# Patient Record
Sex: FEMALE | Race: WHITE | Employment: UNEMPLOYED | URBAN - METROPOLITAN AREA
[De-identification: names, ages, dates, MRNs, and addresses within clinical notes are randomized per-mention and may not be internally consistent; named-entity substitution may affect disease eponyms.]

---

## 2019-09-02 ENCOUNTER — ANESTHESIA EVENT (OUTPATIENT)
Dept: SURGERY | Age: 64
End: 2019-09-02
Payer: COMMERCIAL

## 2019-09-03 ENCOUNTER — HOSPITAL ENCOUNTER (OUTPATIENT)
Age: 64
Setting detail: OUTPATIENT SURGERY
Discharge: HOME | End: 2019-09-03
Attending: RADIOLOGY | Admitting: RADIOLOGY
Payer: COMMERCIAL

## 2019-09-03 ENCOUNTER — ANESTHESIA (OUTPATIENT)
Dept: SURGERY | Age: 64
End: 2019-09-03
Payer: COMMERCIAL

## 2019-09-03 ENCOUNTER — APPOINTMENT (OUTPATIENT)
Dept: MRI IMAGING | Age: 64
End: 2019-09-03
Attending: PSYCHIATRY & NEUROLOGY
Payer: COMMERCIAL

## 2019-09-03 VITALS
HEIGHT: 64 IN | WEIGHT: 293 LBS | RESPIRATION RATE: 19 BRPM | HEART RATE: 75 BPM | DIASTOLIC BLOOD PRESSURE: 65 MMHG | BODY MASS INDEX: 50.02 KG/M2 | OXYGEN SATURATION: 99 % | SYSTOLIC BLOOD PRESSURE: 138 MMHG | TEMPERATURE: 98.2 F

## 2019-09-03 DIAGNOSIS — R51.9 HEADACHE: ICD-10-CM

## 2019-09-03 PROCEDURE — 74011250636 HC RX REV CODE- 250/636: Performed by: ANESTHESIOLOGY

## 2019-09-03 PROCEDURE — A9575 INJ GADOTERATE MEGLUMI 0.1ML: HCPCS | Performed by: RADIOLOGY

## 2019-09-03 PROCEDURE — 77030037088 HC TUBE ENDOTRACH ORAL NSL COVD-A: Performed by: ANESTHESIOLOGY

## 2019-09-03 PROCEDURE — 74011000250 HC RX REV CODE- 250

## 2019-09-03 PROCEDURE — 76210000020 HC REC RM PH II FIRST 0.5 HR

## 2019-09-03 PROCEDURE — 70553 MRI BRAIN STEM W/O & W/DYE: CPT

## 2019-09-03 PROCEDURE — 74011250636 HC RX REV CODE- 250/636

## 2019-09-03 PROCEDURE — 77030021678 HC GLIDESCP STAT DISP VERT -B: Performed by: ANESTHESIOLOGY

## 2019-09-03 PROCEDURE — 76060000033 HC ANESTHESIA 1 TO 1.5 HR

## 2019-09-03 PROCEDURE — 76210000006 HC OR PH I REC 0.5 TO 1 HR

## 2019-09-03 PROCEDURE — 74011250636 HC RX REV CODE- 250/636: Performed by: RADIOLOGY

## 2019-09-03 RX ORDER — SODIUM CHLORIDE, SODIUM LACTATE, POTASSIUM CHLORIDE, CALCIUM CHLORIDE 600; 310; 30; 20 MG/100ML; MG/100ML; MG/100ML; MG/100ML
100 INJECTION, SOLUTION INTRAVENOUS CONTINUOUS
Status: DISCONTINUED | OUTPATIENT
Start: 2019-09-03 | End: 2019-09-05 | Stop reason: HOSPADM

## 2019-09-03 RX ORDER — ROCURONIUM BROMIDE 10 MG/ML
INJECTION, SOLUTION INTRAVENOUS AS NEEDED
Status: DISCONTINUED | OUTPATIENT
Start: 2019-09-03 | End: 2019-09-03 | Stop reason: HOSPADM

## 2019-09-03 RX ORDER — LIDOCAINE HYDROCHLORIDE 20 MG/ML
INJECTION, SOLUTION EPIDURAL; INFILTRATION; INTRACAUDAL; PERINEURAL AS NEEDED
Status: DISCONTINUED | OUTPATIENT
Start: 2019-09-03 | End: 2019-09-03 | Stop reason: HOSPADM

## 2019-09-03 RX ORDER — SUCCINYLCHOLINE CHLORIDE 20 MG/ML
INJECTION INTRAMUSCULAR; INTRAVENOUS AS NEEDED
Status: DISCONTINUED | OUTPATIENT
Start: 2019-09-03 | End: 2019-09-03 | Stop reason: HOSPADM

## 2019-09-03 RX ORDER — SODIUM CHLORIDE 0.9 % (FLUSH) 0.9 %
10 SYRINGE (ML) INJECTION
Status: COMPLETED | OUTPATIENT
Start: 2019-09-03 | End: 2019-09-03

## 2019-09-03 RX ORDER — ONDANSETRON 8 MG/1
4 TABLET, ORALLY DISINTEGRATING ORAL ONCE
Status: COMPLETED | OUTPATIENT
Start: 2019-09-03 | End: 2019-09-03

## 2019-09-03 RX ORDER — DEXAMETHASONE SODIUM PHOSPHATE 4 MG/ML
4 INJECTION, SOLUTION INTRA-ARTICULAR; INTRALESIONAL; INTRAMUSCULAR; INTRAVENOUS; SOFT TISSUE ONCE
Status: DISCONTINUED | OUTPATIENT
Start: 2019-09-03 | End: 2019-09-03 | Stop reason: HOSPADM

## 2019-09-03 RX ORDER — ONDANSETRON 2 MG/ML
4 INJECTION INTRAMUSCULAR; INTRAVENOUS ONCE
Status: COMPLETED | OUTPATIENT
Start: 2019-09-03 | End: 2019-09-03

## 2019-09-03 RX ORDER — GADOTERATE MEGLUMINE 376.9 MG/ML
30 INJECTION INTRAVENOUS
Status: COMPLETED | OUTPATIENT
Start: 2019-09-03 | End: 2019-09-03

## 2019-09-03 RX ORDER — PROPOFOL 10 MG/ML
INJECTION, EMULSION INTRAVENOUS AS NEEDED
Status: DISCONTINUED | OUTPATIENT
Start: 2019-09-03 | End: 2019-09-03 | Stop reason: HOSPADM

## 2019-09-03 RX ADMIN — PROPOFOL 200 MG: 10 INJECTION, EMULSION INTRAVENOUS at 08:56

## 2019-09-03 RX ADMIN — ONDANSETRON 4 MG: 2 INJECTION INTRAMUSCULAR; INTRAVENOUS at 10:08

## 2019-09-03 RX ADMIN — SUCCINYLCHOLINE CHLORIDE 200 MG: 20 INJECTION INTRAMUSCULAR; INTRAVENOUS at 08:56

## 2019-09-03 RX ADMIN — GADOTERATE MEGLUMINE 30 ML: 376.9 INJECTION INTRAVENOUS at 09:22

## 2019-09-03 RX ADMIN — ROCURONIUM BROMIDE 5 MG: 10 INJECTION, SOLUTION INTRAVENOUS at 08:56

## 2019-09-03 RX ADMIN — Medication 10 ML: at 09:22

## 2019-09-03 RX ADMIN — LIDOCAINE HYDROCHLORIDE 100 MG: 20 INJECTION, SOLUTION EPIDURAL; INFILTRATION; INTRACAUDAL; PERINEURAL at 08:56

## 2019-09-03 RX ADMIN — ROCURONIUM BROMIDE 25 MG: 10 INJECTION, SOLUTION INTRAVENOUS at 09:04

## 2019-09-03 RX ADMIN — SODIUM CHLORIDE, SODIUM LACTATE, POTASSIUM CHLORIDE, AND CALCIUM CHLORIDE 100 ML/HR: 600; 310; 30; 20 INJECTION, SOLUTION INTRAVENOUS at 07:21

## 2019-09-03 NOTE — ANESTHESIA POSTPROCEDURE EVALUATION
Procedure(s):  MRI ANESTHESIA/ BRAIN WITH AND WITHOUT.    general    Anesthesia Post Evaluation      Multimodal analgesia: multimodal analgesia used between 6 hours prior to anesthesia start to PACU discharge  Patient location during evaluation: bedside  Patient participation: complete - patient participated  Level of consciousness: awake and responsive to light touch  Pain management: adequate  Airway patency: patent  Anesthetic complications: no  Cardiovascular status: acceptable, hemodynamically stable, blood pressure returned to baseline and stable  Respiratory status: acceptable, unassisted, spontaneous ventilation and nonlabored ventilation  Hydration status: acceptable  Post anesthesia nausea and vomiting:  controlled      Vitals Value Taken Time   /65 9/3/2019 10:15 AM   Temp 36.8 °C (98.2 °F) 9/3/2019  9:47 AM   Pulse 70 9/3/2019 10:17 AM   Resp 20 9/3/2019 10:05 AM   SpO2 98 % 9/3/2019 10:17 AM   Vitals shown include unvalidated device data.

## 2019-09-03 NOTE — ANESTHESIA PREPROCEDURE EVALUATION
Relevant Problems   No relevant active problems       Anesthetic History   No history of anesthetic complications            Review of Systems / Medical History  Patient summary reviewed and pertinent labs reviewed    Pulmonary  Within defined limits                 Neuro/Psych         Headaches and psychiatric history     Cardiovascular                  Exercise tolerance: <4 METS     GI/Hepatic/Renal  Within defined limits              Endo/Other  Within defined limits           Other Findings              Physical Exam    Airway  Mallampati: II  TM Distance: 4 - 6 cm  Neck ROM: normal range of motion   Mouth opening: Normal     Cardiovascular    Rhythm: regular  Rate: normal  Peripheral edema    Pertinent negatives: No murmur  Comments: distant Dental  No notable dental hx       Pulmonary  Breath sounds clear to auscultation              Comments: distant Abdominal         Other Findings            Anesthetic Plan    ASA: 3  Anesthesia type: general          Induction: Intravenous  Anesthetic plan and risks discussed with: Patient      DILIP

## 2019-09-03 NOTE — H&P
Patient: Arnel Fajardo MRN: [id number]  SSN: [id number]    YOB: 1955  Age: 60 y.o. Sex: female      History and Physical    Arnel Fajardo is a 60 y.o. female having Procedure(s):  MRI ANESTHESIA/ BRAIN WITH AND WITHOUT. Allergies: Allergies   Allergen Reactions    Pcn [Penicillins] Anaphylaxis    Sulfa (Sulfonamide Antibiotics) Anaphylaxis    Sulfur Dioxide Anaphylaxis     Patient states reaction with any sulfa drug or sulfur containing substance injested    Azithromycin Other (comments)     Per patient affects equilibrium    Statins-Hmg-Coa Reductase Inhibitors Myalgia        Chief Complaint: headache    History of Present Illness:long history of headahce and vestibular inflammation. Neurology /ENT have desire more imaging. Past Medical History:   Diagnosis Date    Anxiety     BPV (benign positional vertigo)     right     Creatinine elevation     states not diagnosed with CKD. Last creatinine 1.24 5/13/2019     Edema, lower extremity     bilateral LE edema    Lymphedema     BL LE    Pre-diabetes     per patient no meds    Venous stasis dermatitis     BLE    Vestibular neuronitis       Past Surgical History:   Procedure Laterality Date    HX CATARACT REMOVAL Bilateral     IOL    HX HYSTERECTOMY      HX ORTHOPAEDIC Right     \"screw placed\" \"tendon lengthening\"    HX TONSILLECTOMY      \"As a child\"      Family History   Problem Relation Age of Onset    Cancer Mother     Heart Disease Father     Hypertension Father     Lung Disease Father     Hypertension Sister     Stroke Sister       Social History     Tobacco Use    Smoking status: Never Smoker    Smokeless tobacco: Never Used   Substance Use Topics    Alcohol use: Never     Frequency: Never        Prior to Admission medications    Medication Sig Start Date End Date Taking? Authorizing Provider   nystatin (MYCOSTATIN) topical cream Apply  to affected area two (2) times daily as needed for Skin Irritation. Yes Provider, Historical   oxymetazoline HCl (AFRIN NASAL SPRAY NA) by Nasal route as needed. Yes Provider, Historical   sodium chloride (AYR SALINE) 0.65 % nasal squeeze bottle 1 Spray as needed for Congestion. Yes Provider, Historical   cholecalciferol, vitamin D3, (VITAMIN D3) 2,000 unit tab Take  by mouth. Hold for procedure. Yes Provider, Historical        Visit Vitals  /71 (BP 1 Location: Right arm, BP Patient Position: At rest;Supine)   Pulse 71   Temp 36.7 °C (98 °F)   Resp 14   Ht 5' 4\" (1.626 m)   Wt (!) 165.1 kg (364 lb)   SpO2 96%   BMI 62.48 kg/m²        Assessment and Plan:   Padmini Contreras is a 60 y.o. female having Procedure(s):  MRI ANESTHESIA/ BRAIN WITH AND WITHOUT for headache. Given patient anxiety, co morbidities and size, GETA will be employed.     Preanesthesia Evaluation     Last edited 09/03/19 0746 by Sue Tran MD  Date of Service 09/03/19 0745             Relevant Problems   No relevant active problems       Anesthetic History   No history of anesthetic complications            Review of Systems / Medical History  Patient summary reviewed and pertinent labs reviewed    Pulmonary  Within defined limits                 Neuro/Psych         Headaches and psychiatric history     Cardiovascular                  Exercise tolerance: <4 METS     GI/Hepatic/Renal  Within defined limits              Endo/Other  Within defined limits           Other Findings              Physical Exam    Airway  Mallampati: II  TM Distance: 4 - 6 cm  Neck ROM: normal range of motion   Mouth opening: Normal     Cardiovascular    Rhythm: regular  Rate: normal  Peripheral edema    Pertinent negatives: No murmur  Comments: distant Dental  No notable dental hx       Pulmonary  Breath sounds clear to auscultation              Comments: distant Abdominal         Other Findings            Anesthetic Plan    ASA: 3  Anesthesia type: general          Induction: Intravenous  Anesthetic plan and risks discussed with: Patient      GETA          Preanesthesia evaluation performed by Kiko Fam MD            Signed By: Tori William MD     September 3, 2019

## 2019-09-03 NOTE — DISCHARGE INSTRUCTIONS
Follow up with Provider that ordered the MRI. Patient Education        MRI of the Head: About This Test  What is it? MRI (magnetic resonance imaging) is a test that uses a magnetic field and pulses of radio wave energy to make pictures of the organs and structures inside the body. An MRI of the head can give your doctor information about your brain, eyes, ears, and nerves. When you have an MRI, you lie on a table and the table moves into the MRI machine. Why is this test done? An MRI of the head can help find problems such as tumors and infection. It also can check symptoms of a head injury or of diseases such as multiple sclerosis (MS) and stroke. How can you prepare for the test?  Talk to your doctor about all your health conditions before the test. For example, tell your doctor if:  · You are allergic to any medicines. · You are or might be pregnant. · You have a pacemaker, an artificial limb, any metal pins or metal parts in your body, metal heart valves, metal clips in your brain, metal implants in your ears, or any other implanted or prosthetic medical device. · You have an intrauterine device (IUD) in place. · You get nervous in confined spaces. You may need medicine to help you relax. · You wear a patch that contains medicine. · You have kidney disease. What happens before the test?  · You will remove all metal objects, such as hearing aids, dentures, jewelry, watches, and hairpins. · You may need to take off some of your clothes. You will be given a gown to wear during the test. If you do leave some clothes on, make sure you take everything out of your pockets. · You may have contrast material (dye) put into your arm through a tube called an IV. Contrast material helps doctors see specific organs, blood vessels, and most tumors. What happens during the test?  · You will lie on your back on a table that is part of the MRI scanner.  Your head, chest, and arms may be held with straps to help you lie still. · The table will slide into the space that contains the magnet. A device called a coil may be placed over or wrapped around your head. · Inside the scanner you will hear a fan and feel air moving. You may hear tapping, thumping, or snapping noises. You may be given earplugs or headphones to reduce the noise. · You will be asked to hold still during the scan. You may be asked to hold your breath for short periods. · You may be alone in the scanning room, but a technologist will be watching you through a window and talking with you during the test.  What else should you know about the test?  · An MRI does not hurt. · You may feel warmth in the area being examined. This is normal.  · A dye (contrast material) that contains gadolinium may be used in this test. Be sure to tell your doctor if:  ? You are pregnant or think you may be pregnant. ? You have kidney problems. ? You've had more than one test that used gadolinium. · The U.S. Food and Drug Administration (FDA) has safety warnings about gadolinium. But for most people, the benefit of its use in this test outweighs the risk. · If a dye is used, you may feel a quick sting or pinch and some coolness when the IV is started. The dye may give you a metallic taste in your mouth. Some people feel sick to their stomach or get a headache. · If you breastfeed and are concerned about whether the dye used in this test is safe, talk to your doctor. Most experts believe that very little dye passes into breast milk and even less is passed on to the baby. But if you prefer, you can store some of your breast milk ahead of time and use it for a day or two after the test.  How long does the test take? · The test usually takes 30 to 60 minutes but can take as long as 2 hours. What happens after the test?  · You will probably be able to go home right away, depending on the reason for the test.  · You can go back to your usual activities right away.   Follow-up care is a key part of your treatment and safety. Be sure to make and go to all appointments, and call your doctor if you are having problems. It's also a good idea to keep a list of the medicines you take. Ask your doctor when you can expect to have your test results. Where can you learn more? Go to http://merary-deidra.info/. Enter T128 in the search box to learn more about \"MRI of the Head: About This Test.\"  Current as of: March 28, 2019  Content Version: 12.1  © 4714-6085 Healthwise, Incorporated. Care instructions adapted under license by [employer] (which disclaims liability or warranty for this information). If you have questions about a medical condition or this instruction, always ask your healthcare professional. [address] any warranty or liability for your use of this information. After general anesthesia or intravenous sedation, for 24 hours or while taking prescription Narcotics:  · Limit your activities  · A responsible adult needs to be with you for the next 24 hours  · Do not drive and operate hazardous machinery  · Do not make important personal or business decisions  · Do  not drink alcoholic beverages  · If you have not urinated within 8 hours after discharge, please contact your surgeon on call. · If you have sleep apnea and have a CPAP machine, please use it for all naps and sleeping. *  Please give a list of your current medications to your Primary Care Provider. *  Please update this list whenever your medications are discontinued, doses are      changed, or new medications (including over-the-counter products) are added. *  Please carry medication information at all times in case of emergency situations.     These are general instructions for a healthy lifestyle:  No smoking/ No tobacco products/ Avoid exposure to second hand smoke  Surgeon General's Warning:  Quitting smoking now greatly reduces serious risk to your health. Obesity, smoking, and sedentary lifestyle greatly increases your risk for illness  A healthy diet, regular physical exercise & weight monitoring are important for maintaining a healthy lifestyle    You may be retaining fluid if you have a history of heart failure or if you experience any of the following symptoms:  Weight gain of 3 pounds or more overnight or 5 pounds in a week, increased swelling in our hands or feet or shortness of breath while lying flat in bed. Please call your doctor as soon as you notice any of these symptoms; do not wait until your next office visit.

## 2020-05-27 ENCOUNTER — DOCUMENTATION ONLY (OUTPATIENT)
Dept: NUTRITION | Age: 65
End: 2020-05-27

## 2020-05-27 NOTE — PROGRESS NOTES
[facility] Nutrition Counseling: Pt referred with a diagnosis of prediabetes included in referral concerns. Pt most appropriately seen at [facility] Diabetes Self Management Program Phone [phone]. Will transfer referral and clinical notes to that department and notify referring provider.      [facility], MS, RD, CSSD, LD  W: [phone]

## 2021-04-14 ENCOUNTER — TELEPHONE (OUTPATIENT)
Dept: NUTRITION | Age: 66
End: 2021-04-14

## 2021-04-14 NOTE — TELEPHONE ENCOUNTER
Nutrition Counseling: Contacted pt regarding referral. See notes documented in Nutrition Counseling Referral for details. No further follow-up contact from pt. Will close referral for this office and notify referring provider.     [email or web address]  [phone]

## 2024-03-19 SDOH — HEALTH STABILITY: PHYSICAL HEALTH: ON AVERAGE, HOW MANY MINUTES DO YOU ENGAGE IN EXERCISE AT THIS LEVEL?: 0 MIN

## 2024-03-21 ENCOUNTER — OFFICE VISIT (OUTPATIENT)
Dept: INTERNAL MEDICINE CLINIC | Facility: CLINIC | Age: 69
End: 2024-03-21

## 2024-03-21 VITALS
WEIGHT: 293 LBS | SYSTOLIC BLOOD PRESSURE: 142 MMHG | HEIGHT: 62 IN | DIASTOLIC BLOOD PRESSURE: 78 MMHG | BODY MASS INDEX: 53.92 KG/M2

## 2024-03-21 DIAGNOSIS — H81.20 VESTIBULAR NEURONITIS, UNSPECIFIED LATERALITY: ICD-10-CM

## 2024-03-21 DIAGNOSIS — N18.32 STAGE 3B CHRONIC KIDNEY DISEASE (HCC): ICD-10-CM

## 2024-03-21 DIAGNOSIS — E66.01 MORBID OBESITY (HCC): ICD-10-CM

## 2024-03-21 DIAGNOSIS — Z85.51 HISTORY OF BLADDER CANCER: Primary | ICD-10-CM

## 2024-03-21 DIAGNOSIS — E55.9 VITAMIN D DEFICIENCY: ICD-10-CM

## 2024-03-21 DIAGNOSIS — Z90.5 H/O PARTIAL NEPHRECTOMY: ICD-10-CM

## 2024-03-21 DIAGNOSIS — E78.2 MIXED HYPERLIPIDEMIA: ICD-10-CM

## 2024-03-21 DIAGNOSIS — Z77.22 SECOND HAND SMOKE EXPOSURE: ICD-10-CM

## 2024-03-21 DIAGNOSIS — M17.0 PRIMARY OSTEOARTHRITIS OF BOTH KNEES: ICD-10-CM

## 2024-03-21 DIAGNOSIS — R73.03 PREDIABETES: ICD-10-CM

## 2024-03-21 DIAGNOSIS — N60.12 FIBROCYSTIC BREAST CHANGES, BILATERAL: ICD-10-CM

## 2024-03-21 DIAGNOSIS — Z00.00 ROUTINE HEALTH MAINTENANCE: ICD-10-CM

## 2024-03-21 DIAGNOSIS — Z80.3 FAMILY HISTORY OF BREAST CANCER: ICD-10-CM

## 2024-03-21 DIAGNOSIS — Z85.528 HISTORY OF RENAL CELL CANCER: ICD-10-CM

## 2024-03-21 DIAGNOSIS — N60.11 FIBROCYSTIC BREAST CHANGES, BILATERAL: ICD-10-CM

## 2024-03-21 DIAGNOSIS — R42 CHRONIC VERTIGO: ICD-10-CM

## 2024-03-21 DIAGNOSIS — N60.02 BENIGN BREAST CYST IN FEMALE, LEFT: ICD-10-CM

## 2024-03-21 RX ORDER — CYANOCOBALAMIN (VITAMIN B-12) 5000 MCG
5000 TABLET,DISINTEGRATING ORAL DAILY
COMMUNITY

## 2024-03-21 SDOH — ECONOMIC STABILITY: HOUSING INSECURITY
IN THE LAST 12 MONTHS, WAS THERE A TIME WHEN YOU DID NOT HAVE A STEADY PLACE TO SLEEP OR SLEPT IN A SHELTER (INCLUDING NOW)?: NO

## 2024-03-21 SDOH — ECONOMIC STABILITY: FOOD INSECURITY: WITHIN THE PAST 12 MONTHS, YOU WORRIED THAT YOUR FOOD WOULD RUN OUT BEFORE YOU GOT MONEY TO BUY MORE.: NEVER TRUE

## 2024-03-21 SDOH — ECONOMIC STABILITY: FOOD INSECURITY: WITHIN THE PAST 12 MONTHS, THE FOOD YOU BOUGHT JUST DIDN'T LAST AND YOU DIDN'T HAVE MONEY TO GET MORE.: NEVER TRUE

## 2024-03-21 SDOH — ECONOMIC STABILITY: INCOME INSECURITY: HOW HARD IS IT FOR YOU TO PAY FOR THE VERY BASICS LIKE FOOD, HOUSING, MEDICAL CARE, AND HEATING?: NOT HARD AT ALL

## 2024-03-21 ASSESSMENT — PATIENT HEALTH QUESTIONNAIRE - PHQ9
1. LITTLE INTEREST OR PLEASURE IN DOING THINGS: NOT AT ALL
2. FEELING DOWN, DEPRESSED OR HOPELESS: NOT AT ALL
SUM OF ALL RESPONSES TO PHQ QUESTIONS 1-9: 0
SUM OF ALL RESPONSES TO PHQ QUESTIONS 1-9: 0
SUM OF ALL RESPONSES TO PHQ9 QUESTIONS 1 & 2: 0
SUM OF ALL RESPONSES TO PHQ QUESTIONS 1-9: 0
SUM OF ALL RESPONSES TO PHQ QUESTIONS 1-9: 0

## 2024-03-21 NOTE — PROGRESS NOTES
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 No family nearby. Lives in [place]     Right RCC: Discovered on ultrasound during work up for cholelithiasis. S/p nephron sparing partial nephrectomy. Will be followed by Dr Arellano     Bladder cancer, low grade: Discovered during retrieval of kidney stone. Surgery . Returned  with subsequent treatment     CKD: Stage 3b Followed by nephrology- Dr Boss  - SPEP negative for M-spike.   - UMACR within normal range.     S/p cholecystectomy: 2020     S/p right partial nephrectomy: 2020     History of kidney stones:     Vestibular neuritis: 2017. Chronic vertigo. Followed by Dr Garcia - [facility] for vestibular therapy. Unable to lie flat. Has walker at home but does not use when going out. Does not drive     Elevated blood pressure without diagnosis of hypertension: No diagnosis of hypertension     Vitamin D deficiency: Vitamin D3 5000 mg     Intertrigo: Nystatin prn     Hyperlipidemia: Statin intolerance with diffuse myalgias.      OA: Bilateral knees     Morbid obesity: Baseline 300s with 60 pound weight gain since vestibular neuritis     Chronic lymphedema: Has compression wraps and compression machine at home. Lymphedema therapy in past. Stasis dermatitis.      Fibrocystic breasts: Left diagnostic mammo and ultrasound 2024. Screening mammogram due 2024     Family history of breast cancer: Mother  age 55 with metastatic breast cancer          Past Medical History, Past Surgical History, Family history, Social History, and Medications were all reviewed and updated as necessary.     Current Outpatient Medications   Medication Sig Dispense Refill    Vitamin D3 125 MCG (5000 UT) TABS tablet Take 1 tablet by mouth daily      Cyanocobalamin ([employer] VITAMIN B-12) 5000 MCG SUBL Place 5,000 mcg under the tongue daily

## 2024-04-11 NOTE — PROGRESS NOTES
noninvasive.  Benign detrusor muscle present (CE/Labs)     8/30/22: CYSTOSCOPY: Remains without recurrence. Repeat cystoscopy in 3 months.    2/14/23: CYSTOSCOPY: Remains without recurrence. Discussed further surveillance. She is essentially 1 year out. We will extend the 6 months cystoscopy.    3/14/23: US RENAL COMPLETE: Stable postop changes of the right kidney with no residual or recurrent mass identified (CE/Other Results)    8/15/23: CYSTOSCOPY: Remains without recurrence. Repeat cystoscopy 6 months.   -MD notes pt remains without recurrence of kidney cancer on repeat imaging. Continue annual imaging.     9/13/23: Initial Nephrology consult with Dr. Mara Boss for evaluation of solitary functioning left kidney and worsening azotemia  -Baseline labs SPEP, Upn/CR obtained    10/24/23: F/u with Nephrology   -Results of recent lab work consistent with CKD stage 3B.    2/27/24: F/u with nephrology    3/6/24: Referred to [facility] as [facility] insurance will no longer cover her care at [facility]     PATHOLOGY:     SURGICAL PATHOLOGY REPORT 6/16/20  FINAL DIAGNOSIS:   A.  URINARY BLADDER, TRANSURETHRAL RESECTION:      *   NONINVASIVE LOW GRADE PAPILLARY UROTHELIAL CARCINOMA (SEE SYNOPTIC REPORT).      *   MUSCULARIS PROPRIA IS PRESENT AND NOT INVOLVED BY TUMOR.   SYNOPTIC REPORT (URINARY BLADDER, TRANSURETHRAL RESECTION)   PROCEDURE:  TRANSURETHRAL RESECTION OF TUMOR.   TUMOR SITE: NOT SPECIFIED.   HISTOLOGIC TYPE:  PAPILLARY UROTHELIAL CARCINOMA, NONINVASIVE.   ASSOCIATED EPITHELIAL LESIONS:  NONE IDENTIFIED.   HISTOLOGIC GRADE:  LOW GRADE.   TUMOR CONFIGURATION:  PAPILLARY.   MUSCULARIS PROPRIA PRESENCE:  MUSCULARIS PROPRIA (DETRUSOR MUSCLE) IS PRESENT.   LYMPH-VASCULAR INVASION:  NOT IDENTIFIED.   TUMOR EXTENSION:  NONINVASIVE PAPILLARY CARCINOMA.   ADDITIONAL PATHOLOGIC FINDINGS:  NONE IDENTIFIED.   PATHOLOGIC STAGE:  pTa (IN THIS RESECTION MATERIAL).     SURGICAL PATHOLOGY REPORT 12/9/20  FINAL DIAGNOSIS:   A.  RIGHT

## 2024-04-12 ENCOUNTER — OFFICE VISIT (OUTPATIENT)
Dept: ONCOLOGY | Age: 69
End: 2024-04-12

## 2024-04-12 VITALS
RESPIRATION RATE: 18 BRPM | SYSTOLIC BLOOD PRESSURE: 137 MMHG | HEART RATE: 74 BPM | BODY MASS INDEX: 53.92 KG/M2 | OXYGEN SATURATION: 98 % | DIASTOLIC BLOOD PRESSURE: 85 MMHG | HEIGHT: 62 IN | WEIGHT: 293 LBS | TEMPERATURE: 97.5 F

## 2024-04-12 DIAGNOSIS — Z90.5 H/O PARTIAL NEPHRECTOMY: ICD-10-CM

## 2024-04-12 DIAGNOSIS — Z85.51 HISTORY OF BLADDER CANCER: Primary | ICD-10-CM

## 2024-04-12 DIAGNOSIS — Z85.528 HISTORY OF RENAL CELL CANCER: ICD-10-CM

## 2024-04-12 PROCEDURE — 3017F COLORECTAL CA SCREEN DOC REV: CPT | Performed by: UROLOGY

## 2024-04-12 PROCEDURE — 1036F TOBACCO NON-USER: CPT | Performed by: UROLOGY

## 2024-04-12 ASSESSMENT — ENCOUNTER SYMPTOMS: SHORTNESS OF BREATH: 0

## 2024-04-12 NOTE — PATIENT INSTRUCTIONS
Patient Information from Today's Visit        Treatment Summary has been discussed and given to patient:N/A  Follow Up:     -Follow up in Office Visit in 4 months  -We will order a Renal Ultrasound, Chest X-Ray for you to have in August before your next office.          Please refer to After Visit Summary or Knowrom for upcoming appointment information. If you have any questions regarding your upcoming schedule please reach out to your care team through Knowrom or call [phone].          -------------------------------------------------------------------------------------------------------------------  Please call our office at [phone] if you have any  of the following symptoms:   Fever of 100.5 or greater  Chills  Shortness of breath  Swelling or pain in one leg    After office hours an answering service is available and will contact a provider for emergencies or if you are experiencing any of the above symptoms.    Patient does express an interest in My Chart.  My Chart log in information explained on the after visit summary printout at the check-out desk.

## 2024-04-12 NOTE — PROGRESS NOTES
Urologic Oncology  [facility]  [address] [place] [id number]  [phone]          Hilda Knutson  : 1955      INITIAL EVALUATION    Chief Complaint   Patient presents with    New Patient       HPI:     Hilda Knutson is a 69 y.o. female with history of extensively cystic clear-cell renal cell carcinoma status post right robotic partial nephrectomy on 2020 as well as low-grade bladder cancer who is referred for new patient evaluation due to [facility] no longer in network with [facility].    Patient is continued to follow for her RCC primarily with renal ultrasounds due to inability to lay flat for CT scans.  Her most recent imaging was a CT scan without contrast completed at [facility] in 2023 per the patient who had report available at today's visit and negative for recurrent disease.  She denies any flank pain bilaterally.  No urinary complaints including dysuria or hematuria.    Patient has been followed with cystoscopies by Dr. Palma at [facility] for her history of low-grade bladder cancer initially diagnosed in .  She was noted to have recurrence of her left anterior papillary appearing bladder tumor measuring 2 to 3 cm in diameter in 2022 and had TURBT with intravesicular gemcitabine intraoperatively.  She states that she had significant UTI symptoms following treatment with intravesicular gemcitabine.  She is continue to follow with cystoscopy with no further recurrence to date.  Her most recent cystoscopy was completed 3/26/2024 by Dr. Palma and without evidence of recurrent malignancy per report.    Of note she has significant medical comorbidities including morbid obesity, solitary functioning left kidney being followed by nephrology with resulting CKD.  Her most recent creatinine was 1.71 on 2024.  Her baseline appears to be 1.5-1.9.  Her right kidney appears to be atrophied with compensatory hypertrophy of the left kidney

## 2024-04-29 ENCOUNTER — TELEPHONE (OUTPATIENT)
Dept: INTERNAL MEDICINE CLINIC | Facility: CLINIC | Age: 69
End: 2024-04-29

## 2024-04-29 DIAGNOSIS — Z85.528 HISTORY OF RENAL CELL CANCER: Primary | ICD-10-CM

## 2024-04-29 DIAGNOSIS — Z85.51 HISTORY OF BLADDER CANCER: ICD-10-CM

## 2024-04-29 DIAGNOSIS — N18.32 STAGE 3B CHRONIC KIDNEY DISEASE (HCC): ICD-10-CM

## 2024-04-30 NOTE — TELEPHONE ENCOUNTER
Please advise. Pt's dietician and nephrologist wants pt's magnesium checked, so pt is requesting if it can be added to her labs for physical in July. Lab pended.

## 2024-05-10 NOTE — TELEPHONE ENCOUNTER
Please advise. Pt last seen on 3/21/24, follow-up scheduled for 7/25/24. Rx last wrote is unknown.

## 2024-05-10 NOTE — TELEPHONE ENCOUNTER
----- Message from Dora Wen sent at 5/10/2024  3:14 PM EDT -----  Subject: Refill Request    QUESTIONS  Name of Medication? Other - nystatin 711192 unit/gm cream  Patient-reported dosage and instructions? as needed   How many days do you have left? 0  Preferred Pharmacy? [id number]  Pharmacy phone number (if available)? [phone]  ---------------------------------------------------------------------------  --------------  CALL BACK INFO  What is the best way for the office to contact you? OK to leave message on   voicemail  Preferred Call Back Phone Number? [phone]  ---------------------------------------------------------------------------  --------------  SCRIPT ANSWERS  Relationship to Patient? Self

## 2024-05-12 RX ORDER — NYSTATIN 100000 U/G
CREAM TOPICAL 2 TIMES DAILY PRN
Qty: 30 G | Refills: 1 | Status: SHIPPED | OUTPATIENT
Start: 2024-05-12

## 2024-07-10 DIAGNOSIS — Z00.00 ROUTINE HEALTH MAINTENANCE: ICD-10-CM

## 2024-07-10 DIAGNOSIS — R73.03 PREDIABETES: ICD-10-CM

## 2024-07-10 DIAGNOSIS — E55.9 VITAMIN D DEFICIENCY: ICD-10-CM

## 2024-07-10 DIAGNOSIS — N18.32 STAGE 3B CHRONIC KIDNEY DISEASE (HCC): ICD-10-CM

## 2024-07-10 LAB
25(OH)D3 SERPL-MCNC: 60.7 NG/ML (ref 30–100)
ALBUMIN SERPL-MCNC: 4.2 G/DL (ref 3.2–4.6)
ALBUMIN/GLOB SERPL: 1.5 (ref 1–1.9)
ALP SERPL-CCNC: 93 U/L (ref 35–104)
ALT SERPL-CCNC: 24 U/L (ref 12–65)
ANION GAP SERPL CALC-SCNC: 13 MMOL/L (ref 9–18)
AST SERPL-CCNC: 25 U/L (ref 15–37)
BACTERIA URNS QL MICRO: NEGATIVE /HPF
BASOPHILS # BLD: 0.1 K/UL (ref 0–0.2)
BASOPHILS NFR BLD: 1 % (ref 0–2)
BILIRUB SERPL-MCNC: 0.4 MG/DL (ref 0–1.2)
BUN SERPL-MCNC: 28 MG/DL (ref 8–23)
CALCIUM SERPL-MCNC: 10.2 MG/DL (ref 8.8–10.2)
CASTS URNS QL MICRO: 0 /LPF
CHLORIDE SERPL-SCNC: 103 MMOL/L (ref 98–107)
CHOLEST SERPL-MCNC: 202 MG/DL (ref 0–200)
CO2 SERPL-SCNC: 24 MMOL/L (ref 20–28)
CREAT SERPL-MCNC: 1.7 MG/DL (ref 0.6–1.1)
CRYSTALS URNS QL MICRO: 0 /LPF
DIFFERENTIAL METHOD BLD: NORMAL
EOSINOPHIL # BLD: 0.1 K/UL (ref 0–0.8)
EOSINOPHIL NFR BLD: 1 % (ref 0.5–7.8)
EPI CELLS #/AREA URNS HPF: ABNORMAL /HPF (ref 0–5)
ERYTHROCYTE [DISTWIDTH] IN BLOOD BY AUTOMATED COUNT: 14.5 % (ref 11.9–14.6)
EST. AVERAGE GLUCOSE BLD GHB EST-MCNC: 143 MG/DL
GLOBULIN SER CALC-MCNC: 2.9 G/DL (ref 2.3–3.5)
GLUCOSE SERPL-MCNC: 99 MG/DL (ref 70–99)
HBA1C MFR BLD: 6.6 % (ref 0–5.6)
HCT VFR BLD AUTO: 39.8 % (ref 35.8–46.3)
HDLC SERPL-MCNC: 37 MG/DL (ref 40–60)
HDLC SERPL: 5.5 (ref 0–5)
HGB BLD-MCNC: 13.4 G/DL (ref 11.7–15.4)
HYALINE CASTS URNS QL MICRO: ABNORMAL /LPF
IMM GRANULOCYTES # BLD AUTO: 0 K/UL (ref 0–0.5)
IMM GRANULOCYTES NFR BLD AUTO: 0 % (ref 0–5)
LDLC SERPL CALC-MCNC: 106 MG/DL (ref 0–100)
LYMPHOCYTES # BLD: 1.2 K/UL (ref 0.5–4.6)
LYMPHOCYTES NFR BLD: 19 % (ref 13–44)
MCH RBC QN AUTO: 31.4 PG (ref 26.1–32.9)
MCHC RBC AUTO-ENTMCNC: 33.7 G/DL (ref 31.4–35)
MCV RBC AUTO: 93.2 FL (ref 82–102)
MONOCYTES # BLD: 0.4 K/UL (ref 0.1–1.3)
MONOCYTES NFR BLD: 7 % (ref 4–12)
MUCOUS THREADS URNS QL MICRO: 0 /LPF
NEUTS SEG # BLD: 4.2 K/UL (ref 1.7–8.2)
NEUTS SEG NFR BLD: 72 % (ref 43–78)
NRBC # BLD: 0 K/UL (ref 0–0.2)
PLATELET # BLD AUTO: 194 K/UL (ref 150–450)
PMV BLD AUTO: 11.1 FL (ref 9.4–12.3)
POTASSIUM SERPL-SCNC: 4.1 MMOL/L (ref 3.5–5.1)
PROT SERPL-MCNC: 7.1 G/DL (ref 6.3–8.2)
RBC # BLD AUTO: 4.27 M/UL (ref 4.05–5.2)
RBC #/AREA URNS HPF: ABNORMAL /HPF (ref 0–5)
SODIUM SERPL-SCNC: 141 MMOL/L (ref 136–145)
TRIGL SERPL-MCNC: 297 MG/DL (ref 0–150)
TSH, 3RD GENERATION: 3.37 UIU/ML (ref 0.27–4.2)
VLDLC SERPL CALC-MCNC: 59 MG/DL (ref 6–23)
WBC # BLD AUTO: 6 K/UL (ref 4.3–11.1)
WBC URNS QL MICRO: ABNORMAL /HPF (ref 0–4)

## 2024-07-23 NOTE — PROGRESS NOTES
She is not in acute distress.     Appearance: Normal appearance.   HENT:      Head: Atraumatic.      Right Ear: Tympanic membrane and ear canal normal.      Left Ear: Tympanic membrane and ear canal normal.      Nose: Nose normal.   Eyes:      Conjunctiva/sclera: Conjunctivae normal.   Neck:      Thyroid: No thyroid mass, thyromegaly or thyroid tenderness.      Vascular: No carotid bruit.   Cardiovascular:      Rate and Rhythm: Normal rate and regular rhythm.      Pulses:           Radial pulses are 2+ on the right side and 2+ on the left side.        Dorsalis pedis pulses are 2+ on the right side and 2+ on the left side.        Posterior tibial pulses are 2+ on the right side and 2+ on the left side.   Pulmonary:      Effort: Pulmonary effort is normal.      Breath sounds: Normal breath sounds.   Abdominal:      Palpations: Abdomen is soft. There is no hepatomegaly, splenomegaly or mass.      Tenderness: There is no abdominal tenderness.   Musculoskeletal:      Cervical back: Normal range of motion.      Right lower leg: 3+ Edema present.      Left lower leg: 3+ Edema present.   Feet:      Right foot:      Skin integrity: Skin integrity normal.      Toenail Condition: Right toenails are normal.      Left foot:      Skin integrity: Skin integrity normal.      Toenail Condition: Left toenails are normal.   Lymphadenopathy:      Cervical: No cervical adenopathy.   Skin:     Findings: No rash.   Neurological:      General: No focal deficit present.      Mental Status: She is alert and oriented to person, place, and time.      Motor: Motor function is intact.      Gait: Gait normal.   Psychiatric:         Attention and Perception: Attention normal.         Mood and Affect: Mood normal.         Speech: Speech normal.         Behavior: Behavior normal.

## 2024-07-25 ENCOUNTER — OFFICE VISIT (OUTPATIENT)
Dept: INTERNAL MEDICINE CLINIC | Facility: CLINIC | Age: 69
End: 2024-07-25
Payer: COMMERCIAL

## 2024-07-25 VITALS
BODY MASS INDEX: 53.92 KG/M2 | SYSTOLIC BLOOD PRESSURE: 122 MMHG | HEIGHT: 62 IN | DIASTOLIC BLOOD PRESSURE: 82 MMHG | WEIGHT: 293 LBS

## 2024-07-25 DIAGNOSIS — R73.03 PREDIABETES: ICD-10-CM

## 2024-07-25 DIAGNOSIS — N18.32 STAGE 3B CHRONIC KIDNEY DISEASE (HCC): ICD-10-CM

## 2024-07-25 DIAGNOSIS — Z00.00 ANNUAL PHYSICAL EXAM: Primary | ICD-10-CM

## 2024-07-25 DIAGNOSIS — H81.20 VESTIBULAR NEURONITIS, UNSPECIFIED LATERALITY: ICD-10-CM

## 2024-07-25 DIAGNOSIS — Z87.898 HISTORY OF ABNORMAL MAMMOGRAM: ICD-10-CM

## 2024-07-25 DIAGNOSIS — Z80.3 FAMILY HISTORY OF BREAST CANCER: ICD-10-CM

## 2024-07-25 DIAGNOSIS — Z85.51 HISTORY OF BLADDER CANCER: ICD-10-CM

## 2024-07-25 DIAGNOSIS — E66.01 MORBID OBESITY (HCC): ICD-10-CM

## 2024-07-25 DIAGNOSIS — Z90.5 H/O PARTIAL NEPHRECTOMY: ICD-10-CM

## 2024-07-25 DIAGNOSIS — E78.2 MIXED HYPERLIPIDEMIA: ICD-10-CM

## 2024-07-25 DIAGNOSIS — Z85.528 HISTORY OF RENAL CELL CANCER: ICD-10-CM

## 2024-07-25 DIAGNOSIS — E55.9 VITAMIN D DEFICIENCY: ICD-10-CM

## 2024-07-25 PROCEDURE — 99397 PER PM REEVAL EST PAT 65+ YR: CPT | Performed by: NURSE PRACTITIONER

## 2024-07-25 ASSESSMENT — ENCOUNTER SYMPTOMS
EYE REDNESS: 0
BACK PAIN: 0
BLOOD IN STOOL: 0
ABDOMINAL PAIN: 0
DIARRHEA: 0
CONSTIPATION: 0
EYE ITCHING: 0
COUGH: 0
SHORTNESS OF BREATH: 0
NAUSEA: 0
COLOR CHANGE: 0

## 2024-08-06 DIAGNOSIS — Z85.528 HISTORY OF RENAL CELL CANCER: ICD-10-CM

## 2024-08-06 DIAGNOSIS — Z85.51 HISTORY OF BLADDER CANCER: ICD-10-CM

## 2024-08-06 DIAGNOSIS — N18.32 STAGE 3B CHRONIC KIDNEY DISEASE (HCC): ICD-10-CM

## 2024-08-06 LAB — MAGNESIUM SERPL-MCNC: 1.9 MG/DL (ref 1.8–2.4)

## 2024-08-08 ENCOUNTER — TELEPHONE (OUTPATIENT)
Dept: INTERNAL MEDICINE CLINIC | Facility: CLINIC | Age: 69
End: 2024-08-08

## 2024-08-08 DIAGNOSIS — I89.0 LYMPHEDEMA: Primary | ICD-10-CM

## 2024-08-08 NOTE — TELEPHONE ENCOUNTER
Request for prescription for Velcro Compression Wraps for both legs; diagnosis lymphedema; fax to [facility] at [phone]; patient indicates Elva is aware of condition and ongoing need for compression wraps

## 2024-08-12 DIAGNOSIS — K76.89 HEPATIC CYST: Primary | ICD-10-CM

## 2024-08-20 DIAGNOSIS — Z85.528 HISTORY OF RENAL CELL CANCER: Primary | ICD-10-CM

## 2024-08-22 NOTE — PROGRESS NOTES
FOLLOW UP CYSTOSCOPY PROCEDURE CLINIC NOTE      INTERVAL HISTORY: See prior note.  Patient is transferring her follow-up care from [facility].  She has a history of clear-cell RCC and is status post right robotic partial nephrectomy in December 2020.  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  She is here today for surveillance cystoscopy.  She has no complaints today.  She denies any hematuria or dysuria.    She underwent a renal ultrasound on 8/6/2024 which showed no renal masses.  She had an incidental hepatic cyst.  She also had what appeared to be a 6 mm intrarenal left kidney stone.  There were no evidence of hydronephrosis.    Her creatinine on 7/10/2024 was 1.7.    She denies any hematuria or dysuria.  We discussed her ultrasound findings.         HISTORY OF PRESENT ILLNESS: Ms. Hilda Knutson is a 69 y.o. female with a diagnosis of low-grade bladder cancer  with above history; patient is here today for follow-up surveillance cystoscopy.      Past medical history, surgical history, medications, allergies, family history and social history were reviewed and are unchanged other than what is noted above. Patient denies any hematuria or new lower urinary tract symptoms.      REVIEW OF SYSTEMS: As above. All other systems negative.    ALLERGIES:  Allergies   Allergen Reactions    Penicillins Anaphylaxis    Sulfa Antibiotics Anaphylaxis    Sulfur Dioxide Anaphylaxis     Patient states reaction with any sulfa drug or sulfur containing substance injested    Azithromycin Other (See Comments)     Per patient affects equilibrium       PHYSICAL EXAMINATION:  BP (!) 151/77 (Site: Left Upper Arm, Position: Sitting)   Pulse 79   Temp 97.5 °F (36.4 °C) (Oral)   Resp 16   Ht 1.575 m (5' 2\")   Wt (!) 165.9 kg (365 lb 11.2 oz)   SpO2 98%   BMI 66.89 kg/m²   GENERAL: The patient is in no acute distress.  CV:  Reg. Normal perfusion  PULMONARY:

## 2024-08-22 NOTE — PATIENT INSTRUCTIONS
Patient Information from Today's Visit    The members of your [facility] are listed below:    Physician Provider: Forutnato Arellano Urologic Oncologist  Advanced Practice Clinician: LEVON Regalado  Nurse Navigator: Anna MARCUM RN  Medical Assistant: Della MCCOLLUM MA  :Aliyah CULP  Supportive Care Services: Kaitlyn VALE LMSW    Diagnosis:  low-grade bladder cancer     Follow Up Instructions:        Treatment Summary has been discussed and given to patient:       Current Labs:            Please refer to After Visit Summary or MyChart for upcoming appointment information. Please call our office for rescheduling needs at least 24 hours before your scheduled appointment time.If you have any questions regarding your upcoming schedule please reach out to your care team through [employer] or call [phone].     Please notify your assigned Nurse Navigator of any unplanned hospital admissions or Emergency Room visits within 24 hours of discharge.    -------------------------------------------------------------------------------------------------------------------  Please call our office at [phone] if you have any  of the following symptoms:   Fever of 100.5 or greater  Chills  Shortness of breath  Swelling or pain in one leg    After office hours an answering service is available and will contact a provider for emergencies or if you are experiencing any of the above symptoms.        Della MCCOLLUM MA

## 2024-08-23 ENCOUNTER — PROCEDURE VISIT (OUTPATIENT)
Dept: ONCOLOGY | Age: 69
End: 2024-08-23

## 2024-08-23 ENCOUNTER — HOSPITAL ENCOUNTER (OUTPATIENT)
Dept: LAB | Age: 69
Discharge: HOME | End: 2024-08-26

## 2024-08-23 VITALS
BODY MASS INDEX: 53.92 KG/M2 | SYSTOLIC BLOOD PRESSURE: 151 MMHG | HEIGHT: 62 IN | RESPIRATION RATE: 16 BRPM | HEART RATE: 79 BPM | DIASTOLIC BLOOD PRESSURE: 77 MMHG | TEMPERATURE: 97.5 F | WEIGHT: 293 LBS | OXYGEN SATURATION: 98 %

## 2024-08-23 DIAGNOSIS — Z90.5 H/O PARTIAL NEPHRECTOMY: ICD-10-CM

## 2024-08-23 DIAGNOSIS — Z85.51 HISTORY OF BLADDER CANCER: Primary | ICD-10-CM

## 2024-08-23 DIAGNOSIS — Z85.528 HISTORY OF RENAL CELL CANCER: ICD-10-CM

## 2024-08-23 LAB
BILIRUBIN, URINE, POC: NEGATIVE
BLOOD URINE, POC: NORMAL
GLUCOSE URINE, POC: NEGATIVE
KETONES, URINE, POC: NEGATIVE
LEUKOCYTE ESTERASE, URINE, POC: NORMAL
NITRITE, URINE, POC: NEGATIVE
PH, URINE, POC: 5.5 (ref 4.6–8)
PROTEIN,URINE, POC: NEGATIVE
SPECIFIC GRAVITY, URINE, POC: 1.02 (ref 1–1.03)
URINALYSIS CLARITY, POC: CLEAR
URINALYSIS COLOR, POC: YELLOW
UROBILINOGEN, POC: NORMAL

## 2024-08-23 NOTE — PROGRESS NOTES
Pre Cystoscopy   At risk factors reviewed:  Autoimmune diseases: no  Artificial Joints: no  Mechanical heart valve/pacemaker: no  Indwelling ureteral stent: no  Indwelling catheter: no  Elderly >80: no  Smoker: no  Oral steroid/prednisone use: no  Low weight: no  Hx of frequent UTI's: no  Prolonged hospital stay in the past 6mths (>10days): no  Diabetes: no    Provider informed of at risk factors: yes    ABX given: no

## 2024-10-26 ENCOUNTER — APPOINTMENT (OUTPATIENT)
Dept: ULTRASOUND IMAGING | Age: 69
End: 2024-10-26
Payer: COMMERCIAL

## 2024-10-26 ENCOUNTER — APPOINTMENT (OUTPATIENT)
Dept: CT IMAGING | Age: 69
End: 2024-10-26
Payer: COMMERCIAL

## 2024-10-26 ENCOUNTER — HOSPITAL ENCOUNTER (EMERGENCY)
Age: 69
Discharge: HOME | End: 2024-10-26
Attending: EMERGENCY MEDICINE
Payer: COMMERCIAL

## 2024-10-26 VITALS
HEART RATE: 76 BPM | WEIGHT: 293 LBS | SYSTOLIC BLOOD PRESSURE: 148 MMHG | RESPIRATION RATE: 17 BRPM | BODY MASS INDEX: 47.09 KG/M2 | HEIGHT: 66 IN | OXYGEN SATURATION: 97 % | TEMPERATURE: 98.3 F | DIASTOLIC BLOOD PRESSURE: 70 MMHG

## 2024-10-26 DIAGNOSIS — N39.0 URINARY TRACT INFECTION WITH HEMATURIA, SITE UNSPECIFIED: ICD-10-CM

## 2024-10-26 DIAGNOSIS — R31.9 URINARY TRACT INFECTION WITH HEMATURIA, SITE UNSPECIFIED: ICD-10-CM

## 2024-10-26 DIAGNOSIS — N20.0 KIDNEY STONE: Primary | ICD-10-CM

## 2024-10-26 LAB
ALBUMIN SERPL-MCNC: 4 G/DL (ref 3.2–4.6)
ALBUMIN/GLOB SERPL: 1.1 (ref 1–1.9)
ALP SERPL-CCNC: 108 U/L (ref 35–104)
ALT SERPL-CCNC: 22 U/L (ref 8–45)
ANION GAP SERPL CALC-SCNC: 13 MMOL/L (ref 9–18)
APPEARANCE UR: ABNORMAL
AST SERPL-CCNC: 23 U/L (ref 15–37)
BACTERIA URNS QL MICRO: ABNORMAL /HPF
BASOPHILS # BLD: 0 K/UL (ref 0–0.2)
BASOPHILS NFR BLD: 0 % (ref 0–2)
BILIRUB SERPL-MCNC: 0.3 MG/DL (ref 0–1.2)
BILIRUB UR QL: NEGATIVE
BUN SERPL-MCNC: 27 MG/DL (ref 8–23)
CALCIUM SERPL-MCNC: 10.3 MG/DL (ref 8.8–10.2)
CASTS URNS QL MICRO: ABNORMAL /LPF
CHLORIDE SERPL-SCNC: 104 MMOL/L (ref 98–107)
CO2 SERPL-SCNC: 24 MMOL/L (ref 20–28)
COLOR UR: ABNORMAL
CREAT SERPL-MCNC: 1.65 MG/DL (ref 0.6–1.1)
DIFFERENTIAL METHOD BLD: NORMAL
EOSINOPHIL # BLD: 0.1 K/UL (ref 0–0.8)
EOSINOPHIL NFR BLD: 1 % (ref 0.5–7.8)
EPI CELLS #/AREA URNS HPF: ABNORMAL /HPF
ERYTHROCYTE [DISTWIDTH] IN BLOOD BY AUTOMATED COUNT: 14.6 % (ref 11.9–14.6)
GLOBULIN SER CALC-MCNC: 3.6 G/DL (ref 2.3–3.5)
GLUCOSE SERPL-MCNC: 136 MG/DL (ref 70–99)
GLUCOSE UR STRIP.AUTO-MCNC: NEGATIVE MG/DL
HCT VFR BLD AUTO: 39.1 % (ref 35.8–46.3)
HGB BLD-MCNC: 12.9 G/DL (ref 11.7–15.4)
HGB UR QL STRIP: ABNORMAL
IMM GRANULOCYTES # BLD AUTO: 0 K/UL (ref 0–0.5)
IMM GRANULOCYTES NFR BLD AUTO: 1 % (ref 0–5)
KETONES UR QL STRIP.AUTO: NEGATIVE MG/DL
LEUKOCYTE ESTERASE UR QL STRIP.AUTO: ABNORMAL
LIPASE SERPL-CCNC: 44 U/L (ref 13–60)
LYMPHOCYTES # BLD: 0.9 K/UL (ref 0.5–4.6)
LYMPHOCYTES NFR BLD: 14 % (ref 13–44)
MCH RBC QN AUTO: 30.7 PG (ref 26.1–32.9)
MCHC RBC AUTO-ENTMCNC: 33 G/DL (ref 31.4–35)
MCV RBC AUTO: 93.1 FL (ref 82–102)
MONOCYTES # BLD: 0.5 K/UL (ref 0.1–1.3)
MONOCYTES NFR BLD: 7 % (ref 4–12)
NEUTS SEG # BLD: 5.1 K/UL (ref 1.7–8.2)
NEUTS SEG NFR BLD: 77 % (ref 43–78)
NITRITE UR QL STRIP.AUTO: NEGATIVE
NRBC # BLD: 0 K/UL (ref 0–0.2)
OTHER OBSERVATIONS: ABNORMAL
PH UR STRIP: 5 (ref 5–9)
PLATELET # BLD AUTO: 182 K/UL (ref 150–450)
PMV BLD AUTO: 10.2 FL (ref 9.4–12.3)
POTASSIUM SERPL-SCNC: 4 MMOL/L (ref 3.5–5.1)
PROT SERPL-MCNC: 7.6 G/DL (ref 6.3–8.2)
PROT UR STRIP-MCNC: 30 MG/DL
RBC # BLD AUTO: 4.2 M/UL (ref 4.05–5.2)
RBC #/AREA URNS HPF: ABNORMAL /HPF
SODIUM SERPL-SCNC: 141 MMOL/L (ref 136–145)
SP GR UR REFRACTOMETRY: 1.01 (ref 1–1.02)
UROBILINOGEN UR QL STRIP.AUTO: 0.2 EU/DL (ref 0.2–1)
WBC # BLD AUTO: 6.6 K/UL (ref 4.3–11.1)
WBC URNS QL MICRO: ABNORMAL /HPF

## 2024-10-26 PROCEDURE — 80053 COMPREHEN METABOLIC PANEL: CPT

## 2024-10-26 PROCEDURE — 85025 COMPLETE CBC W/AUTO DIFF WBC: CPT

## 2024-10-26 PROCEDURE — 99284 EMERGENCY DEPT VISIT MOD MDM: CPT

## 2024-10-26 PROCEDURE — 83690 ASSAY OF LIPASE: CPT

## 2024-10-26 PROCEDURE — 76770 US EXAM ABDO BACK WALL COMP: CPT

## 2024-10-26 PROCEDURE — 81001 URINALYSIS AUTO W/SCOPE: CPT

## 2024-10-26 PROCEDURE — 87086 URINE CULTURE/COLONY COUNT: CPT

## 2024-10-26 RX ORDER — NITROFURANTOIN 25; 75 MG/1; MG/1
100 CAPSULE ORAL 2 TIMES DAILY
Qty: 14 CAPSULE | Refills: 0 | Status: SHIPPED | OUTPATIENT
Start: 2024-10-26 | End: 2024-11-02

## 2024-10-26 ASSESSMENT — ENCOUNTER SYMPTOMS
ABDOMINAL PAIN: 1
SINUS PAIN: 0
BACK PAIN: 0
SHORTNESS OF BREATH: 0
EYE REDNESS: 0
NAUSEA: 0
CONSTIPATION: 0
COUGH: 0
WHEEZING: 0
VOMITING: 0
TROUBLE SWALLOWING: 0
EYE ITCHING: 0
EYE PAIN: 0
DIARRHEA: 0

## 2024-10-26 ASSESSMENT — PAIN - FUNCTIONAL ASSESSMENT: PAIN_FUNCTIONAL_ASSESSMENT: 0-10

## 2024-10-26 ASSESSMENT — PAIN DESCRIPTION - LOCATION: LOCATION: FLANK

## 2024-10-26 ASSESSMENT — PAIN DESCRIPTION - PAIN TYPE: TYPE: ACUTE PAIN

## 2024-10-26 NOTE — ED NOTES
Pt leaving for US at this time via wheelchair with transport.      Delores Lovell, RN  10/26/24 1109

## 2024-10-26 NOTE — PROGRESS NOTES
Attempted patient for CT scan.  She can not tolerate and does not want us to do the scan.  Noted on ER trackboard

## 2024-10-26 NOTE — ED PROVIDER NOTES
Emergency Department Provider Note       PCP: Elva Gray, APRN - CNP   Age: 69 y.o.   Sex: female     DISPOSITION Decision To Discharge 10/26/2024 12:32:01 PM            ICD-10-CM    1. Kidney stone  N20.0       2. Urinary tract infection with hematuria, site unspecified  N39.0     R31.9           Medical Decision Making     69-year-old female patient presents to the ER with complaints of left flank pain  History of kidney stones  Patient gave us a urine specimen today she did pass a stone  Urine did have evidence of infection today as well  Renal ultrasound revealed the cyst but no other acute findings  Patient will be discharged I will start Macrobid.  Urine culture ordered  Close follow-up with her urologist as advised     1 acute complicated illness or injury.  Prescription drug management performed.  Patient was discharged risks and benefits of hospitalization were considered.  Shared medical decision making was utilized in creating the patients health plan today.    I independently ordered and reviewed each unique test.  I reviewed external records: ED visit note from an outside group.  I reviewed external records: provider visit note from PCP.  I reviewed external records: provider visit note from outside specialist.   The patients assessment required an independent historian: Spouse at bedside.  The reason they were needed is important historical information not provided by the patient.  Renal ultrasound revealed no acute pathology per the radiologist written report.  Small cyst was noted.              History     69-year-old female patient presents to the ER with complaints of left flank pain  Relatively sudden onset early this morning  History of kidney stones  No fever has had some nausea but no vomiting  Essentially has only 1 functioning kidney and is followed closely by both nephrology and urology      The history is provided by the patient and the spouse.   Flank Pain  This is a new  1.0 - 1.9     Lipase   Result Value Ref Range    Lipase 44 13 - 60 U/L   Urinalysis   Result Value Ref Range    Color, UA YELLOW/STRAW      Appearance CLOUDY      Specific [place], UA 1.014 1.001 - 1.023      pH, Urine 5.0 5.0 - 9.0      Protein, UA 30 (A) NEG mg/dL    Glucose, Ur Negative NEG mg/dL    Ketones, Urine Negative NEG mg/dL    Bilirubin, Urine Negative NEG      Blood, Urine LARGE (A) NEG      Urobilinogen, Urine 0.2 0.2 - 1.0 EU/dL    Nitrite, Urine Negative NEG      Leukocyte Esterase, Urine SMALL (A) NEG      WBC, UA 20-50 0 /hpf    RBC, UA  0 /hpf    Epithelial Cells, UA 0-3 0 /hpf    BACTERIA, URINE TRACE 0 /hpf    Casts 0-3 0 /lpf    Other observations RESULTS VERIFIED MANUALLY           US RETROPERITONEAL COMPLETE   Final Result   Limited examination without findings of nephrolithiasis.      2 cm cystic area about the upper portion of the left renal pelvis not well   identified on prior exams. Unclear if this represents a parapelvic cyst versus   calyceal dilation/hydronephrosis. CT renal stone protocol may be useful for   further evaluation.         Electronically signed by Patricio Hickman                   No results for input(s): \"COVID19\" in the last 72 hours.    Voice dictation software was used during the making of this note.  This software is not perfect and grammatical and other typographical errors may be present.  This note has not been completely proofread for errors.     Flip Muro MD  10/26/24 5824

## 2024-10-26 NOTE — DISCHARGE INSTRUCTIONS
Take antibiotics as directed  Follow-up with your urologist on Monday  Drink plenty of fluids    Call your doctor or the follow up doctor to set up appointment for recheck visit    Return to ER for any worsening symptoms or new problems which may arise

## 2024-10-26 NOTE — ED NOTES
Pt states she was unable to complete CT scan r/t positioning and claustrophobia. States she has had difficulty with scans in past and denies medications helping in past. Pt requesting US to be completed. Provider made aware.     Delores Lovell RN  10/26/24 2739

## 2024-10-26 NOTE — ED NOTES
Patient mobility status  with mild difficulty. Provider aware     I have reviewed discharge instructions with the patient.  The patient verbalized understanding.    Patient left ED via Discharge Method: ambulatory to Home with Significant Other.    Opportunity for questions and clarification provided.     Patient given 1 e- scripts.            Lovell, Delores, RN  10/26/24 7209

## 2024-10-26 NOTE — ED TRIAGE NOTES
Pt states she is having  left flank pain that started around 0300    States she knows she has a kidney stone, pt only has one functioning kidney

## 2024-10-27 LAB
BACTERIA SPEC CULT: NORMAL
SERVICE CMNT-IMP: NORMAL

## 2024-10-28 LAB
BACTERIA SPEC CULT: NORMAL
SERVICE CMNT-IMP: NORMAL

## 2024-10-30 ENCOUNTER — TELEPHONE (OUTPATIENT)
Dept: ONCOLOGY | Age: 69
End: 2024-10-30

## 2024-10-30 DIAGNOSIS — N28.1 RENAL CYST, LEFT: Primary | ICD-10-CM

## 2024-10-30 DIAGNOSIS — Z85.528 HISTORY OF RENAL CELL CANCER: ICD-10-CM

## 2024-10-30 DIAGNOSIS — N28.1 RENAL CYST: Primary | ICD-10-CM

## 2024-10-30 DIAGNOSIS — N20.0 KIDNEY STONE: ICD-10-CM

## 2024-10-30 DIAGNOSIS — N28.1 RENAL CYST, LEFT: ICD-10-CM

## 2024-10-30 NOTE — TELEPHONE ENCOUNTER
I called and spoke with the patient regarding her ED visit over the weekend for flank pain with gross hematuria.  She was unable to tolerate a CT scan during her ED visit and renal ultrasound was without evidence of renal calculus bilaterally.  She had a known lower pole 6 mm calculus of the left kidney on ultrasound 8/6/2024 that was not identified on her most recent ultrasound.  She states that she passed a stone a pink immediately upon arrival to the ED and later passed what appeared to be another stone.  She has been taking Macrobid as prescribed for suspected UTI and is having a difficult time tolerating this.  I have reviewed her urinalysis results which did demonstrate large RBCs, small leuk esterase and nitrite negative.  Her urine culture from 10/26/2024 was negative.  I discussed that she is not tolerating the Macrobid she can discontinue this at this time as her labs are inconsistent with significant UTI.  She continues to have intermittent spasm.  Offered prescription for oxybutynin which was declined.  She understands that she can call for this should she wish to try this in the future.  I would like to obtain a CT abdomen pelvis with and without contrast due to mention of a new 2 cm cyst upper pole of the left kidney that was not on previous renal ultrasound.  She states that she has a difficult time tolerating imaging and requires her CT scans to be done at [facility].  She understands that she will require a paper order for imaging completed outside of [place].  I will discuss with Rose or Della on how to get her set up for scan in about 4 weeks at [facility] returning to clinic to see me after and review results.  If her imaging at that time demonstrates no further evidence of renal stone and confirms suspected simple renal cyst the left kidney we will plan to continue follow-up in 1 year as already scheduled.

## 2024-11-01 ENCOUNTER — TELEPHONE (OUTPATIENT)
Dept: ONCOLOGY | Age: 69
End: 2024-11-01

## 2024-11-01 NOTE — TELEPHONE ENCOUNTER
Patient called in stating that she was returning a call to schedule an appointment with Anupama. [phone].

## 2024-11-12 DIAGNOSIS — N28.1 RENAL CYST, LEFT: ICD-10-CM

## 2024-11-12 DIAGNOSIS — Z85.528 HISTORY OF RENAL CELL CANCER: ICD-10-CM

## 2024-11-12 DIAGNOSIS — N20.0 KIDNEY STONE: ICD-10-CM

## 2024-11-12 LAB
ANION GAP SERPL CALC-SCNC: 13 MMOL/L (ref 7–16)
BUN SERPL-MCNC: 27 MG/DL (ref 8–23)
CALCIUM SERPL-MCNC: 9.9 MG/DL (ref 8.8–10.2)
CHLORIDE SERPL-SCNC: 104 MMOL/L (ref 98–107)
CO2 SERPL-SCNC: 25 MMOL/L (ref 20–29)
CREAT SERPL-MCNC: 1.7 MG/DL (ref 0.6–1.1)
GLUCOSE SERPL-MCNC: 104 MG/DL (ref 70–99)
POTASSIUM SERPL-SCNC: 4 MMOL/L (ref 3.5–5.1)
SODIUM SERPL-SCNC: 141 MMOL/L (ref 136–145)

## 2024-11-20 ENCOUNTER — TELEPHONE (OUTPATIENT)
Dept: RADIATION ONCOLOGY | Age: 69
End: 2024-11-20

## 2024-11-20 NOTE — TELEPHONE ENCOUNTER
Patient called in stating that when she went in for her CT scan they were not able to get a vein for her to get the CT scan. She is asking if it is ok to do the CT scan without the contrast since they had such an issue.    Please advise.

## 2024-11-20 NOTE — TELEPHONE ENCOUNTER
Physician provider: Dr. Arellano  Reason for today's call (Please detail here patients chief complaint): Pt request to speak with Anupama Ellis about appt.   Last office visit:08/23/24  Patient Callback Number: [phone]  Was callback number verified?: Yes  Preferred pharmacy (If refill request): na  Calls to office within the last 48 hours?:No    Patient notified that their information will be routed to the [facility] clinical triage team for review. Patient is advised that they will receive a phone call from the triage department. If symptom related and symptoms worsen before receiving a call back, the patient has been advised to proceed to the nearest ED.

## 2024-11-21 DIAGNOSIS — N20.0 KIDNEY STONE: Primary | ICD-10-CM

## 2024-11-21 DIAGNOSIS — N28.1 RENAL CYST, LEFT: ICD-10-CM

## 2024-12-04 ENCOUNTER — TELEPHONE (OUTPATIENT)
Dept: ONCOLOGY | Age: 69
End: 2024-12-04

## 2024-12-04 DIAGNOSIS — N20.0 KIDNEY STONE: Primary | ICD-10-CM

## 2024-12-04 DIAGNOSIS — N28.1 RENAL CYST, LEFT: ICD-10-CM

## 2024-12-04 NOTE — TELEPHONE ENCOUNTER
Received a call from the patient stating that she has not been able to get the CT scan completed.  She is wondering if she should still come in for her follow up appointment next week.  She has gone multiple times for the scan, however due to her underlying health issues, she was not able to tolerate getting the scan done    Please advise.

## 2024-12-04 NOTE — TELEPHONE ENCOUNTER
I called patient regarding her intolerance to CT imaging prior to her follow-up visit with me next week.  She was unable to obtain IV access for contrast and her CT order was changed to a noncontrasted scan.  Unfortunately she was unable to tolerate this as well.  She states that she passed a stone upon presentation to the ED for initial visit and has had no further flank pain, dysuria or hematuria since that time.  We will plan for a repeat renal ultrasound prior to her follow-up visit with Dr. Arellano in August to monitor stability in size of her left renal cyst.  CT scan will be canceled as well as her follow-up visit with me to review.  She understands to call with any questions or concerns prior to her scheduled return visit for cystoscopy.

## 2024-12-20 ENCOUNTER — TELEPHONE (OUTPATIENT)
Dept: ONCOLOGY | Age: 69
End: 2024-12-20

## 2024-12-20 NOTE — TELEPHONE ENCOUNTER
Returned call to scheduling.  Informed renal us needs to be completed prior to August OV. Verbalized understanding.

## 2025-01-14 ENCOUNTER — TELEPHONE (OUTPATIENT)
Dept: ONCOLOGY | Age: 70
End: 2025-01-14

## 2025-01-14 NOTE — TELEPHONE ENCOUNTER
Patient passed a kidney stone this morning.  Previously instructed to present to ER if she passes one due to only having one kidney.  Urine continues to have small amount of blood in urine- color is brownish red.  Denies clots.  Continues to have pain.  Rates pain currently @ 4-5.  Patient needs to know if she still needs to go to ER as previously instructed by Dr. Arellano.

## 2025-01-14 NOTE — TELEPHONE ENCOUNTER
Chart reviewed by PA.  Returned call to patient.  Informed, per PA, increase fluids and NSAIDs and continue to monitor. If her pain persists or if she develops a fever she should have a low threshold to present to the ED. Patient verbalized understanding.

## 2025-01-14 NOTE — TELEPHONE ENCOUNTER
Physician provider: Dr. Arellano  Reason for today's call (Please detail here patients chief complaint): kidney stone  Last office visit:n/a  Patient Callback Number: [phone]  Was callback number verified?: Yes  Preferred pharmacy (If refill request): n/a  Calls to office within the last 48 hours?:No    Patient notified that their information will be routed to the [facility] clinical triage team for review. Patient is advised that they will receive a phone call from the triage department. If symptom related and symptoms worsen before receiving a call back, the patient has been advised to proceed to the nearest ED.          Pt stated she just passed a kidney stone and would like to know if she should go to the ER    [phone]

## 2025-04-04 NOTE — TELEPHONE ENCOUNTER
Physician provider: Dr. Arellano  Reason for today's call (Please detail here patients chief complaint): Ultrasound orders  Last office visit:na  Patient Callback Number: [phone]  Was callback number verified?: Yes  Preferred pharmacy (If refill request): na  Calls to office within the last 48 hours?:No    Patient notified that their information will be routed to the [facility] clinical triage team for review. Patient is advised that they will receive a phone call from the triage department. If symptom related and symptoms worsen before receiving a call back, the patient has been advised to proceed to the nearest ED.     Patients orders for Renal ultrasound has different dates to be completed. Please advise?   Her/She

## 2025-04-07 ENCOUNTER — TELEPHONE (OUTPATIENT)
Dept: ONCOLOGY | Age: 70
End: 2025-04-07

## 2025-04-07 DIAGNOSIS — R30.0 DYSURIA: Primary | ICD-10-CM

## 2025-04-07 NOTE — TELEPHONE ENCOUNTER
Subjective    Diagnosis: Bladder cancer    Chief Complaint (Brief): Possible UTI.  Patient with incontinence, pink tinged urine and burning with urination.  Concentrated.  Denies taking Cranberry tablets listed on med list.    Initial Onset: 2 days ago  Medication List Reviewed with patient and accuracy verified: Yes  Patient taking medications as prescribed: Yes  Difficulty Breathing/ Lower Extremity Swelling: Yes- Years of edema previously documented  New Onset Altered Mental Status: No  Last oral temperature and time, if known:Denies fever  Pain Score(0-10): 0  Description of pain (location and characteristics): na  Fatigue Score (0-10):0  Any Identifiable Triggers to Complaint: na      Objective    Date of last Office Visit: 8/24/25   Treatment type, if applicable:   Date of last treatment, if applicable:na  Recent surgery (include date,if applicable): na  State of Incision: na        Plan/Intervention    Initial Action Plan: RN to Provider Consultation  Patient verbalized understanding of plan: YES/NO: Yes  Patient voiced intended compliance with plan: Verbalized/ Refused: Verbalized intended compliance    Resolution: Instructed to push fluids.  Informed will forward to Dr. Arellano's team.    Final Plan: Pending

## 2025-04-07 NOTE — TELEPHONE ENCOUNTER
Physician provider: Dr. Arellano  Reason for today's call (Please detail here patients chief complaint): possible UTI  Last office visit:NA  Patient Callback Number: [phone]  Was callback number verified?: Yes  Preferred pharmacy (If refill request): NA  Veriified that patient confirmed no refills left at pharmacy? :No  Calls to office within the last 48 hours?:No    Warm Transfer to (For Red Words): no    Patient notified that their information will be routed to the [facility] clinical triage team for review. Patient is advised that they will receive a phone call from the triage department. If symptom related and symptoms worsen before receiving a call back, the patient has been advised to proceed to the nearest ED.      Patient think she have a UTI  [phone]

## 2025-04-09 DIAGNOSIS — R30.0 DYSURIA: ICD-10-CM

## 2025-04-09 LAB
APPEARANCE UR: CLEAR
BACTERIA URNS QL MICRO: NEGATIVE /HPF
BILIRUB UR QL: NEGATIVE
COLOR UR: ABNORMAL
EPI CELLS #/AREA URNS HPF: ABNORMAL /HPF (ref 0–5)
GLUCOSE UR STRIP.AUTO-MCNC: NEGATIVE MG/DL
HGB UR QL STRIP: ABNORMAL
HYALINE CASTS URNS QL MICRO: ABNORMAL /LPF
KETONES UR QL STRIP.AUTO: NEGATIVE MG/DL
LEUKOCYTE ESTERASE UR QL STRIP.AUTO: ABNORMAL
NITRITE UR QL STRIP.AUTO: NEGATIVE
PH UR STRIP: 5 (ref 5–9)
PROT UR STRIP-MCNC: NEGATIVE MG/DL
RBC #/AREA URNS HPF: ABNORMAL /HPF (ref 0–5)
SP GR UR REFRACTOMETRY: 1.01 (ref 1–1.02)
UROBILINOGEN UR QL STRIP.AUTO: 0.2 EU/DL (ref 0.2–1)
WBC URNS QL MICRO: ABNORMAL /HPF (ref 0–4)

## 2025-04-11 LAB
BACTERIA SPEC CULT: NORMAL
SERVICE CMNT-IMP: NORMAL